# Patient Record
Sex: FEMALE | URBAN - NONMETROPOLITAN AREA
[De-identification: names, ages, dates, MRNs, and addresses within clinical notes are randomized per-mention and may not be internally consistent; named-entity substitution may affect disease eponyms.]

---

## 2023-07-18 ENCOUNTER — APPOINTMENT (RX ONLY)
Dept: URBAN - NONMETROPOLITAN AREA CLINIC 14 | Facility: CLINIC | Age: 38
Setting detail: DERMATOLOGY
End: 2023-07-18

## 2023-07-18 DIAGNOSIS — L57.8 OTHER SKIN CHANGES DUE TO CHRONIC EXPOSURE TO NONIONIZING RADIATION: ICD-10-CM

## 2023-07-18 DIAGNOSIS — D18.0 HEMANGIOMA: ICD-10-CM

## 2023-07-18 DIAGNOSIS — D22 MELANOCYTIC NEVI: ICD-10-CM

## 2023-07-18 DIAGNOSIS — B35.1 TINEA UNGUIUM: ICD-10-CM | Status: WORSENING

## 2023-07-18 PROBLEM — D22.5 MELANOCYTIC NEVI OF TRUNK: Status: ACTIVE | Noted: 2023-07-18

## 2023-07-18 PROBLEM — D18.01 HEMANGIOMA OF SKIN AND SUBCUTANEOUS TISSUE: Status: ACTIVE | Noted: 2023-07-18

## 2023-07-18 PROCEDURE — ? PRESCRIPTION MEDICATION MANAGEMENT

## 2023-07-18 PROCEDURE — ? COUNSELING

## 2023-07-18 PROCEDURE — ? PRESCRIPTION

## 2023-07-18 PROCEDURE — 99204 OFFICE O/P NEW MOD 45 MIN: CPT

## 2023-07-18 PROCEDURE — ? SUNSCREEN RECOMMENDATIONS

## 2023-07-18 RX ORDER — CICLOPIROX 80 MG/ML
SOLUTION TOPICAL QHS
Qty: 6.6 | Refills: 2 | Status: ERX | COMMUNITY
Start: 2023-07-18

## 2023-07-18 RX ORDER — TERBINAFINE HYDROCHLORIDE 250 MG/1
TABLET ORAL QD
Qty: 90 | Refills: 0 | Status: ERX | COMMUNITY
Start: 2023-07-18

## 2023-07-18 RX ADMIN — TERBINAFINE HYDROCHLORIDE 1: 250 TABLET ORAL at 00:00

## 2023-07-18 RX ADMIN — CICLOPIROX AAA: 80 SOLUTION TOPICAL at 00:00

## 2023-07-18 ASSESSMENT — LOCATION DETAILED DESCRIPTION DERM
LOCATION DETAILED: LEFT INFERIOR FOREHEAD
LOCATION DETAILED: RIGHT SUPERIOR LATERAL UPPER BACK
LOCATION DETAILED: LEFT SUPERIOR UPPER BACK

## 2023-07-18 ASSESSMENT — LOCATION SIMPLE DESCRIPTION DERM
LOCATION SIMPLE: LEFT FOREHEAD
LOCATION SIMPLE: LEFT UPPER BACK
LOCATION SIMPLE: RIGHT BACK

## 2023-07-18 ASSESSMENT — LOCATION ZONE DERM
LOCATION ZONE: TRUNK
LOCATION ZONE: FACE

## 2023-07-18 NOTE — PROCEDURE: COUNSELING
Detail Level: Detailed
Detail Level: Generalized
Patient Specific Counseling (Will Not Stick From Patient To Patient): PATIENT VERY BOTHERED, CULTURE SHOWED MOLD, LABS RECENTLY DONE AND WNL BY DR MEDINA, WILL START TERBINAFINE

## 2024-07-09 ENCOUNTER — RX ONLY (OUTPATIENT)
Age: 39
Setting detail: RX ONLY
End: 2024-07-09

## 2024-07-09 RX ORDER — CICLOPIROX 80 MG/ML
AAA SOLUTION TOPICAL QHS
Qty: 6.6 | Refills: 0 | Status: ERX

## 2024-12-11 ENCOUNTER — APPOINTMENT (OUTPATIENT)
Dept: URBAN - NONMETROPOLITAN AREA CLINIC 14 | Facility: CLINIC | Age: 39
Setting detail: DERMATOLOGY
End: 2024-12-11

## 2024-12-11 VITALS — HEIGHT: 59 IN | WEIGHT: 131 LBS

## 2024-12-11 DIAGNOSIS — L82.1 OTHER SEBORRHEIC KERATOSIS: ICD-10-CM

## 2024-12-11 DIAGNOSIS — Z71.89 OTHER SPECIFIED COUNSELING: ICD-10-CM

## 2024-12-11 DIAGNOSIS — D18.0 HEMANGIOMA: ICD-10-CM

## 2024-12-11 DIAGNOSIS — D22 MELANOCYTIC NEVI: ICD-10-CM

## 2024-12-11 PROBLEM — D18.01 HEMANGIOMA OF SKIN AND SUBCUTANEOUS TISSUE: Status: ACTIVE | Noted: 2024-12-11

## 2024-12-11 PROBLEM — D22.5 MELANOCYTIC NEVI OF TRUNK: Status: ACTIVE | Noted: 2024-12-11

## 2024-12-11 PROCEDURE — ? COUNSELING

## 2024-12-11 PROCEDURE — 99212 OFFICE O/P EST SF 10 MIN: CPT

## 2024-12-11 ASSESSMENT — LOCATION SIMPLE DESCRIPTION DERM
LOCATION SIMPLE: LEFT UPPER BACK
LOCATION SIMPLE: RIGHT CHEEK
LOCATION SIMPLE: RIGHT UPPER BACK

## 2024-12-11 ASSESSMENT — LOCATION ZONE DERM
LOCATION ZONE: TRUNK
LOCATION ZONE: FACE

## 2024-12-11 ASSESSMENT — LOCATION DETAILED DESCRIPTION DERM
LOCATION DETAILED: LEFT SUPERIOR UPPER BACK
LOCATION DETAILED: RIGHT SUPERIOR UPPER BACK
LOCATION DETAILED: RIGHT CENTRAL MANDIBULAR CHEEK
LOCATION DETAILED: RIGHT INFERIOR UPPER BACK
LOCATION DETAILED: RIGHT MID-UPPER BACK

## 2025-03-26 NOTE — HPI: SKIN LESIONS
Is This A New Presentation, Or A Follow-Up?: Skin Lesion
How Severe Are Your Spot(S)?: mild
Have Your Spot(S) Been Treated In The Past?: has not been treated
Hpi Title: Evaluation of Skin Lesions